# Patient Record
Sex: FEMALE | Race: WHITE | NOT HISPANIC OR LATINO | Employment: OTHER | URBAN - METROPOLITAN AREA
[De-identification: names, ages, dates, MRNs, and addresses within clinical notes are randomized per-mention and may not be internally consistent; named-entity substitution may affect disease eponyms.]

---

## 2017-12-12 ENCOUNTER — ESTABLISHED COMPREHENSIVE EXAM (OUTPATIENT)
Dept: URBAN - METROPOLITAN AREA CLINIC 43 | Facility: CLINIC | Age: 72
End: 2017-12-12

## 2017-12-12 DIAGNOSIS — H35.3131: ICD-10-CM

## 2017-12-12 DIAGNOSIS — H35.373: ICD-10-CM

## 2017-12-12 DIAGNOSIS — Z96.1: ICD-10-CM

## 2017-12-12 DIAGNOSIS — D31.31: ICD-10-CM

## 2017-12-12 PROCEDURE — 92015 DETERMINE REFRACTIVE STATE: CPT

## 2017-12-12 PROCEDURE — 4177F TALK PT/CRGVR RE AREDS PREV: CPT

## 2017-12-12 PROCEDURE — 92014 COMPRE OPH EXAM EST PT 1/>: CPT

## 2017-12-12 PROCEDURE — 1036F TOBACCO NON-USER: CPT

## 2017-12-12 PROCEDURE — 2019F DILATED MACUL EXAM DONE: CPT

## 2017-12-12 PROCEDURE — G8785 BP SCRN NO PERF AT INTERVAL: HCPCS

## 2017-12-12 PROCEDURE — G8428 CUR MEDS NOT DOCUMENT: HCPCS

## 2017-12-12 ASSESSMENT — VISUAL ACUITY
OD_SC: 20/80-1
OS_SC: J2
OD_SC: J8
OS_CC: 20/40-3
OS_SC: 20/70
OD_CC: 20/40

## 2017-12-12 ASSESSMENT — TONOMETRY
OD_IOP_MMHG: 15
OS_IOP_MMHG: 16

## 2017-12-27 NOTE — PATIENT DISCUSSION
POSTERIOR CAPSULAR FIBROSIS, OU:  VISUALLY SIGNIFICANT. OPTION OF YAG LASER VERSUS UPDATING GLASSES VERSUS FOLLOWING DISCUSSED. RBA'S DISCUSSED, PATIENT UNDERSTANDS AND DESIRES YAG LASER TO INCREASE VISION FOR READING AND TO HELP WITH GLARE.   SCHEDULE YAG LASER OS THEN OD.

## 2018-11-07 NOTE — PATIENT DISCUSSION
GLAUCOMA SUSPECT, OU : INTRAOCULAR PRESSURE IS WITHIN ACCEPTABLE LIMITS. THINNING NOTED ON OCT. RTC HVF/IOP/PACHS/PHOTOS.

## 2018-12-07 ENCOUNTER — EST. PATIENT EMERGENCY (OUTPATIENT)
Dept: URBAN - METROPOLITAN AREA CLINIC 43 | Facility: CLINIC | Age: 73
End: 2018-12-07

## 2018-12-07 DIAGNOSIS — H00.12: ICD-10-CM

## 2018-12-07 DIAGNOSIS — H00.15: ICD-10-CM

## 2018-12-07 PROCEDURE — 1036F TOBACCO NON-USER: CPT

## 2018-12-07 PROCEDURE — G9903 PT SCRN TBCO ID AS NON USER: HCPCS

## 2018-12-07 PROCEDURE — G8428 CUR MEDS NOT DOCUMENT: HCPCS

## 2018-12-07 PROCEDURE — 92012 INTRM OPH EXAM EST PATIENT: CPT

## 2018-12-07 RX ORDER — NEOMYCIN SULFATE, POLYMYXIN B SULFATE AND DEXAMETHASONE 3.5; 10000; 1 MG/ML; [USP'U]/ML; MG/ML: 1 SUSPENSION OPHTHALMIC

## 2018-12-07 ASSESSMENT — VISUAL ACUITY
OS_CC: 20/50+1
OD_CC: 20/30-3

## 2018-12-07 ASSESSMENT — TONOMETRY
OS_IOP_MMHG: 12
OD_IOP_MMHG: 12

## 2018-12-11 ENCOUNTER — ESTABLISHED COMPREHENSIVE EXAM (OUTPATIENT)
Dept: URBAN - METROPOLITAN AREA CLINIC 43 | Facility: CLINIC | Age: 73
End: 2018-12-11

## 2018-12-11 DIAGNOSIS — H00.12: ICD-10-CM

## 2018-12-11 DIAGNOSIS — H35.3131: ICD-10-CM

## 2018-12-11 DIAGNOSIS — D31.31: ICD-10-CM

## 2018-12-11 DIAGNOSIS — H00.15: ICD-10-CM

## 2018-12-11 PROCEDURE — 2019F DILATED MACUL EXAM DONE: CPT

## 2018-12-11 PROCEDURE — 92014 COMPRE OPH EXAM EST PT 1/>: CPT

## 2018-12-11 PROCEDURE — G9903 PT SCRN TBCO ID AS NON USER: HCPCS

## 2018-12-11 PROCEDURE — G8785 BP SCRN NO PERF AT INTERVAL: HCPCS

## 2018-12-11 PROCEDURE — 4177F TALK PT/CRGVR RE AREDS PREV: CPT

## 2018-12-11 PROCEDURE — G8427 DOCREV CUR MEDS BY ELIG CLIN: HCPCS

## 2018-12-11 PROCEDURE — 92015 DETERMINE REFRACTIVE STATE: CPT

## 2018-12-11 PROCEDURE — 1036F TOBACCO NON-USER: CPT

## 2018-12-11 RX ORDER — TOBRAMYCIN AND DEXAMETHASONE 1; 3 MG/ML; MG/ML: 1 SUSPENSION/ DROPS OPHTHALMIC

## 2018-12-11 ASSESSMENT — VISUAL ACUITY
OS_CC: 20/30-2
OS_CC: J4
OD_CC: J10
OD_SC: J6
OS_SC: J4
OD_CC: 20/30
OD_SC: 20/40-2
OS_SC: 20/30-2

## 2018-12-11 ASSESSMENT — TONOMETRY
OD_IOP_MMHG: 15
OS_IOP_MMHG: 15

## 2019-01-11 NOTE — PATIENT DISCUSSION
GLAUCOMA SUSPECT, OU : INTRAOCULAR PRESSURE AND HVF IS WITHIN ACCEPTABLE LIMITS. PACHS THICK, PHOTOS TO DOCUMENT ONH. TEMP EDGE PTS TODAY OS (LENS?), NONSPECIFIC CHANGES OD. NO DROP THERAPY NEEDED AT THIS TIME.

## 2019-03-28 ENCOUNTER — EST. PATIENT EMERGENCY (OUTPATIENT)
Dept: URBAN - METROPOLITAN AREA CLINIC 43 | Facility: CLINIC | Age: 74
End: 2019-03-28

## 2019-03-28 DIAGNOSIS — H00.15: ICD-10-CM

## 2019-03-28 DIAGNOSIS — H00.12: ICD-10-CM

## 2019-03-28 PROCEDURE — 92012 INTRM OPH EXAM EST PATIENT: CPT

## 2019-03-28 ASSESSMENT — VISUAL ACUITY
OS_CC: 20/40-1+1
OD_CC: 20/40+1

## 2019-05-07 NOTE — PATIENT DISCUSSION
DECOMPENSATED ESOPHORIA CAUSING DIPLOPIA IN THE EVENINGS/WHEN EYES ARE TIRED (11 ABIDA ON [place]): PRESCRIBED VERY MILD ABIDA PRISM (2 ABIDA, TRIAL FRAMED) AND RECOMMENDED SHE SEE DR. LONGORIA IF THIS DOES NOT IMPROVE SYMPTOMS. PT UNDERSTANDS AND WILL CALL BACK FOR REFERRAL IF NECESSARY .

## 2019-05-07 NOTE — PATIENT DISCUSSION
GLAUCOMA SUSPECT, OU : INTRAOCULAR PRESSURE  IS WITHIN ACCEPTABLE LIMITS. NO DROP THERAPY NEEDED AT THIS TIME.

## 2019-11-14 NOTE — PATIENT DISCUSSION
GLAUCOMA SUSPECT, OU : INTRAOCULAR PRESSURE AND OCT  IS WITHIN ACCEPTABLE LIMITS. HVF 24-2 SCHEDULED. NO DROP THERAPY NEEDED AT THIS TIME.

## 2019-11-14 NOTE — PATIENT DISCUSSION
DIPLOPIA / ESOPHORIA: PRISM RX GLASSES PRESCRIBED, IF NO IMPROVEMENT SUGGEST REFERRAL TO DR LONGORIA.

## 2019-11-14 NOTE — PATIENT DISCUSSION
"Chief Complaint   Patient presents with   â¢ Pain     pain in L rib. Began 11/21/18 -pt taking ranitidine and tums. States feels like a gassy feeling. Denies any other sx's. Pain is a 10 on 1-10 pain scale       HISTORY OF PRESENT ILLNESS:   Stephen Hall presents with left sided rib pain which started 5 days ago and it has been going away. She rates the pain as a 10 out of 10. Consistent in duration and sharp pain. Denies heart palpitations, chest pain, shortness of breath, chest tightness. She has tried ranitidine and tums for stomach acid. She states that it does feel like acid reflux but she has never had it where it doesn't go away. Prior to this it was going away with the use of TUMS. Does admit to an increase in burping too. ""Feels like gas pain up in her rib. \""      Patient also admits that she has not been taking any of her medications for diabetes, hypertension, or depression due to lack of medical insurance. She just got medical insurance and will start medications today. Past Medical History:   Diagnosis Date   â¢ Bronchitis    â¢ Colon polyps 02/29/2016    Dr. Abdi Malave. 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 Abdi Malave. Repeat in 5 years. History of polyp.    â¢ Eye surgery      remove tumor on eyelid   â¢ Foot surgery Left 12/09/2016    5th toe arthroplasty   â¢ Hysterectomy     â¢ Tubal ligation         Current Outpatient Medications " General: Medication Sig   â¢ calcium carbonate (TUMS) 500 MG chewable tablet Chew 1 tablet by mouth daily. â¢ RANITIDINE HCL PO    â¢ pantoprazole (PROTONIX) 40 MG tablet Take 1 tablet by mouth daily. â¢ Simethicone 41.667 MG Chew Tab Chew 1 tablet by mouth 3 times daily. After meals or at bedtime   â¢ fluticasone (FLOVENT HFA) 110 MCG/ACT inhaler Inhale 1 puff into the lungs 2 times daily. â¢ hydroCORTisone (CORTIZONE) 2.5 % cream Apply topically 2 times daily. â¢ albuterol (PROAIR HFA) 108 (90 Base) MCG/ACT inhaler Inhale 2 puffs into the lungs every 4 hours as needed for Shortness of Breath or Wheezing. â¢ gabapentin (NEURONTIN) 100 MG capsule Take 1 capsule by mouth nightly. â¢ pramoxine-hydrocortisone cream Apply MD TID PRN Hemorrhoids   â¢ losartan (COZAAR) 50 MG tablet Take 1 tablet by mouth daily. â¢ metFORMIN (GLUCOPHAGE) 500 MG tablet Take 1 tablet by mouth 2 times daily (with meals). â¢ desvenlafaxine (PRISTIQ) 50 MG 24 hr tablet Take 1 tablet by mouth daily. â¢ DISPENSE Give glucose meter as per insurance. Test once daily. Dx e11.9   â¢ DISPENSE Give glucose test meter strips as per Insurance. To check once daily. Dx e11.9   â¢ DISPENSE Give Glucose test lancets as per Health insurance. Pt test once daily. Dx e11.9   â¢ COMFORT ASSURED LANCETS 33G Misc Use to check glucose daily   â¢ Chlorphen-Pseudoephed-APAP (TYLENOL ALLERGY SINUS PO) Take 1 tablet by mouth daily as needed. No current facility-administered medications for this visit.         ALLERGIES:   Allergen Reactions   â¢ Naproxen SWELLING   â¢ Topiramate HIVES and SWELLING   â¢ Abilify HIVES   â¢ Aspirin HIVES   â¢ Ativan HIVES     Swelling/Angioedema   â¢ Chicken Allergy   (Food Or Med) HIVES   â¢ Clonazepam HIVES   â¢ Cyclobenzaprine HIVES and SWELLING   â¢ Diclofenac HIVES     Swelling/angioedema   â¢ Ketorolac HIVES     Swelling/angioedema   â¢ Lexapro HIVES   â¢ Olanzapine HIVES and PRURITUS   â¢ Paxil [Paroxetine] HIVES     Swelling/angioedema   â¢ "Penicillins HIVES     Swelling/angioedema/  states has had amoxicillin within a few years without problems does not remember date and not on drug list   â¢ Pregabalin Other (See Comments)     Floating feeling and more pain   â¢ Quetiapine Fumarate HIVES   â¢ Ritalin [Methylphenidate] HIVES and SWELLING   â¢ Seroquel [Quetiapine] HIVES and SWELLING   â¢ Topamax HIVES and SWELLING   â¢ Zoloft HIVES and SWELLING         Tobacco Use: Yes           Packs/Day: .5    Years:            Comment: refused quit packet      Vitals:    11/26/18 1551 11/26/18 1600   BP: 148/88 154/90   Pulse: 78    Resp: 16    Temp: 98.2 Â°F (36.8 Â°C)    TempSrc: Oral    Weight: 97.6 kg    Height: 5' 4"" (1.626 m)    PainSc: 9-10        Wt Readings from Last 4 Encounters:   11/26/18 97.6 kg   10/04/18 97 kg   11/06/17 96.2 kg   11/02/17 96.2 kg       REVIEW OF SYSTEMS:  The remainder of the review of systems are negative. PHYSICAL EXAM:   GENERAL:  Patient is a 45year old female who presents in no acute distress. Patient is well developed, well nourished, alert and oriented to person, place, and thing. HEAD:  Normocephalic, atraumatic. [facility] NECK:  Supple, no thyromegaly, no cervical or supraclavicular lymphadenopathy. CHEST:  Clear to auscultation bilaterally, no wheeze, no rales, no visible use of accessory muscles. CARDIOVASCULAR:  Regular rate and rhythm, no murmur, splits, clicks or rubs, no edema. ABDOMEN:  Round, soft, normoactive bowel sounds, tympanic upon percussion, no hepatosplenomegaly, no costovertebral angle tenderness, no guarding. Some tenderness with palpation to left upper quadrant. ASSESSMENT/PLAN:  1. (K21.9) Gastroesophageal reflux disease, esophagitis presence not specified  (primary encounter diagnosis)  Plan: switch to using pantoprazole (PROTONIX) 40 MG tablet once daily. --May continue TUMS for symptoms.     2.(R14.2) Burping  Plan: pantoprazole (PROTONIX) 40 MG tablet,         Simethicone 41.667 MG Chew Tab after " meals.  --Recommended the use of a probiotic daily to assist with excess gas. 3. (R03.0) Elevated blood pressure reading  Comment: Patient has not been taking medication. Plan: Restart losartan. 4. (R10.12) Left upper quadrant pain  Plan: CMP, amylase and lipase levels. 5. (Z12.31) Breast cancer screening  Comment: due for screening. Plan: MAMMO SCREENING BILATERAL      Orders placed this visit:  Orders Placed This Encounter   â¢ Mammo Screening Bilateral   â¢ Comprehensive Metabolic Panel   â¢ Amylase Level   â¢ Lipase Level   â¢ calcium carbonate (TUMS) 500 MG chewable tablet   â¢ RANITIDINE HCL PO   â¢ pantoprazole (PROTONIX) 40 MG tablet   â¢ Simethicone 41.667 MG Chew Tab       No Follow-up on file. Krystina Watters  [facility] [address], [address]  [address] [facility] [address] [phone]    My collaborating providers are Dr. Brynn Mcdonough and Dr. Anuja Richards.

## 2019-12-11 ENCOUNTER — ESTABLISHED COMPREHENSIVE EXAM (OUTPATIENT)
Dept: URBAN - METROPOLITAN AREA CLINIC 43 | Facility: CLINIC | Age: 74
End: 2019-12-11

## 2019-12-11 DIAGNOSIS — H35.373: ICD-10-CM

## 2019-12-11 DIAGNOSIS — Z96.1: ICD-10-CM

## 2019-12-11 DIAGNOSIS — H35.3131: ICD-10-CM

## 2019-12-11 PROCEDURE — 92015 DETERMINE REFRACTIVE STATE: CPT

## 2019-12-11 PROCEDURE — 92014 COMPRE OPH EXAM EST PT 1/>: CPT

## 2019-12-11 RX ORDER — NEOMYCIN SULFATE, POLYMYXIN B SULFATE AND DEXAMETHASONE 3.5; 10000; 1 MG/ML; [USP'U]/ML; MG/ML: 1 SUSPENSION OPHTHALMIC

## 2019-12-11 ASSESSMENT — VISUAL ACUITY
OS_SC: J2
OD_CC: J8
OS_CC: J2
OS_CC: 20/30-2
OS_SC: 20/50-2
OD_SC: 20/50-2
OD_CC: 20/40
OD_SC: J6

## 2019-12-11 ASSESSMENT — TONOMETRY
OD_IOP_MMHG: 16
OS_IOP_MMHG: 16

## 2020-12-15 ENCOUNTER — ESTABLISHED COMPREHENSIVE EXAM (OUTPATIENT)
Dept: URBAN - METROPOLITAN AREA CLINIC 43 | Facility: CLINIC | Age: 75
End: 2020-12-15

## 2020-12-15 DIAGNOSIS — E11.9: ICD-10-CM

## 2020-12-15 DIAGNOSIS — Z96.1: ICD-10-CM

## 2020-12-15 DIAGNOSIS — H35.3131: ICD-10-CM

## 2020-12-15 DIAGNOSIS — D31.31: ICD-10-CM

## 2020-12-15 PROCEDURE — 92250 FUNDUS PHOTOGRAPHY W/I&R: CPT

## 2020-12-15 PROCEDURE — 92015 DETERMINE REFRACTIVE STATE: CPT

## 2020-12-15 PROCEDURE — 92014 COMPRE OPH EXAM EST PT 1/>: CPT

## 2020-12-15 ASSESSMENT — VISUAL ACUITY
OD_CC: 20/60+1
OD_PH: 20/40-2
OD_SC: 20/60
OS_SC: 20/40-2
OS_SC: J1
OD_SC: J8
OS_CC: 20/40-2

## 2020-12-15 ASSESSMENT — TONOMETRY
OD_IOP_MMHG: 14
OS_IOP_MMHG: 15

## 2021-12-15 ENCOUNTER — COMPREHENSIVE EXAM (OUTPATIENT)
Dept: URBAN - METROPOLITAN AREA CLINIC 43 | Facility: CLINIC | Age: 76
End: 2021-12-15

## 2021-12-15 DIAGNOSIS — H35.373: ICD-10-CM

## 2021-12-15 DIAGNOSIS — E11.9: ICD-10-CM

## 2021-12-15 DIAGNOSIS — H35.3131: ICD-10-CM

## 2021-12-15 DIAGNOSIS — Z96.1: ICD-10-CM

## 2021-12-15 DIAGNOSIS — D31.31: ICD-10-CM

## 2021-12-15 PROCEDURE — 92014 COMPRE OPH EXAM EST PT 1/>: CPT

## 2021-12-15 PROCEDURE — 92015 DETERMINE REFRACTIVE STATE: CPT

## 2021-12-15 ASSESSMENT — VISUAL ACUITY
OS_SC: J1
OS_SC: 20/50-1
OD_SC: J10
OS_CC: 20/40-1
OD_CC: 20/60-1
OD_SC: 20/60

## 2021-12-15 ASSESSMENT — TONOMETRY
OD_IOP_MMHG: 14
OS_IOP_MMHG: 14

## 2022-02-24 NOTE — PATIENT DISCUSSION
MAP DOT FINGERPRINT OU: PRESCRIBE ARTIFICIAL TEARS FOR COMFORT. CONSIDER CORNEAL CONSULT IF CHANCES OCCUR.

## 2022-12-15 ENCOUNTER — COMPREHENSIVE EXAM (OUTPATIENT)
Dept: URBAN - METROPOLITAN AREA CLINIC 43 | Facility: CLINIC | Age: 77
End: 2022-12-15

## 2022-12-15 PROCEDURE — 92014 COMPRE OPH EXAM EST PT 1/>: CPT

## 2022-12-15 PROCEDURE — 92015 DETERMINE REFRACTIVE STATE: CPT

## 2022-12-15 ASSESSMENT — VISUAL ACUITY
OS_CC: 20/50+2
OD_CC: 20/50-1
OD_SC: J6-
OS_CC: J3
OD_SC: 20/80-1
OS_SC: J2
OD_CC: J2-
OS_SC: 20/50-1

## 2022-12-15 ASSESSMENT — TONOMETRY
OS_IOP_MMHG: 14
OD_IOP_MMHG: 12

## 2023-12-19 ENCOUNTER — COMPREHENSIVE EXAM (OUTPATIENT)
Dept: URBAN - METROPOLITAN AREA CLINIC 43 | Facility: CLINIC | Age: 78
End: 2023-12-19

## 2023-12-19 DIAGNOSIS — H35.3131: ICD-10-CM

## 2023-12-19 DIAGNOSIS — D31.31: ICD-10-CM

## 2023-12-19 DIAGNOSIS — H04.123: ICD-10-CM

## 2023-12-19 DIAGNOSIS — Z96.1: ICD-10-CM

## 2023-12-19 DIAGNOSIS — E11.9: ICD-10-CM

## 2023-12-19 DIAGNOSIS — H35.373: ICD-10-CM

## 2023-12-19 PROCEDURE — 92014 COMPRE OPH EXAM EST PT 1/>: CPT

## 2023-12-19 PROCEDURE — 92015 DETERMINE REFRACTIVE STATE: CPT

## 2023-12-19 ASSESSMENT — VISUAL ACUITY
OD_SC: 20/70-2
OD_CC: 20/60+1
OS_SC: 20/40-1
OD_PH: 20/30
OD_SC: J6
OS_CC: 20/30-1
OS_SC: J2

## 2023-12-19 ASSESSMENT — TONOMETRY
OS_IOP_MMHG: 18
OD_IOP_MMHG: 16

## 2024-02-22 ENCOUNTER — EMERGENCY VISIT (OUTPATIENT)
Dept: URBAN - METROPOLITAN AREA CLINIC 43 | Facility: CLINIC | Age: 79
End: 2024-02-22

## 2024-02-22 DIAGNOSIS — D31.31: ICD-10-CM

## 2024-02-22 DIAGNOSIS — H35.3131: ICD-10-CM

## 2024-02-22 DIAGNOSIS — H35.373: ICD-10-CM

## 2024-02-22 DIAGNOSIS — Z96.1: ICD-10-CM

## 2024-02-22 DIAGNOSIS — H04.123: ICD-10-CM

## 2024-02-22 DIAGNOSIS — E11.9: ICD-10-CM

## 2024-02-22 PROCEDURE — 92012 INTRM OPH EXAM EST PATIENT: CPT

## 2024-02-22 ASSESSMENT — VISUAL ACUITY
OS_CC: 20/50
OD_CC: 20/50-2
OS_PH: 20/40

## 2024-07-05 ENCOUNTER — EMERGENCY VISIT (OUTPATIENT)
Dept: URBAN - METROPOLITAN AREA CLINIC 43 | Facility: CLINIC | Age: 79
End: 2024-07-05

## 2024-07-05 DIAGNOSIS — H04.123: ICD-10-CM

## 2024-07-05 DIAGNOSIS — H35.373: ICD-10-CM

## 2024-07-05 DIAGNOSIS — H00.15: ICD-10-CM

## 2024-07-05 DIAGNOSIS — E11.9: ICD-10-CM

## 2024-07-05 DIAGNOSIS — H35.3131: ICD-10-CM

## 2024-07-05 DIAGNOSIS — D31.31: ICD-10-CM

## 2024-07-05 DIAGNOSIS — Z96.1: ICD-10-CM

## 2024-07-05 PROCEDURE — 92012 INTRM OPH EXAM EST PATIENT: CPT

## 2024-07-05 RX ORDER — NEOMYCIN SULFATE, POLYMYXIN B SULFATE AND DEXAMETHASONE 3.5; 10000; 1 MG/ML; [USP'U]/ML; MG/ML: 1 SUSPENSION OPHTHALMIC

## 2024-07-05 ASSESSMENT — VISUAL ACUITY
OD_CC: 20/40
OS_CC: 20/50+1

## 2024-07-05 ASSESSMENT — TONOMETRY
OD_IOP_MMHG: 14
OS_IOP_MMHG: 14

## 2025-01-06 ENCOUNTER — COMPREHENSIVE EXAM (OUTPATIENT)
Age: 80
End: 2025-01-06

## 2025-01-06 DIAGNOSIS — H04.123: ICD-10-CM

## 2025-01-06 DIAGNOSIS — D31.31: ICD-10-CM

## 2025-01-06 DIAGNOSIS — H35.373: ICD-10-CM

## 2025-01-06 DIAGNOSIS — H35.3131: ICD-10-CM

## 2025-01-06 DIAGNOSIS — E11.9: ICD-10-CM

## 2025-01-06 DIAGNOSIS — Z96.1: ICD-10-CM

## 2025-01-06 PROCEDURE — 92012 INTRM OPH EXAM EST PATIENT: CPT

## 2025-01-06 PROCEDURE — 92015 DETERMINE REFRACTIVE STATE: CPT
